# Patient Record
Sex: MALE | Race: WHITE
[De-identification: names, ages, dates, MRNs, and addresses within clinical notes are randomized per-mention and may not be internally consistent; named-entity substitution may affect disease eponyms.]

---

## 2020-10-22 ENCOUNTER — HOSPITAL ENCOUNTER (OUTPATIENT)
Dept: HOSPITAL 41 - JD.SDS | Age: 65
Discharge: HOME | End: 2020-10-22
Attending: SURGERY
Payer: COMMERCIAL

## 2020-10-22 VITALS — SYSTOLIC BLOOD PRESSURE: 127 MMHG | DIASTOLIC BLOOD PRESSURE: 82 MMHG | HEART RATE: 72 BPM

## 2020-10-22 DIAGNOSIS — K64.8: Primary | ICD-10-CM

## 2020-10-22 DIAGNOSIS — D50.9: ICD-10-CM

## 2020-10-22 DIAGNOSIS — Z90.49: ICD-10-CM

## 2020-10-22 DIAGNOSIS — F41.9: ICD-10-CM

## 2020-10-22 DIAGNOSIS — Z01.812: ICD-10-CM

## 2020-10-22 DIAGNOSIS — Z20.828: ICD-10-CM

## 2020-10-22 DIAGNOSIS — K21.9: ICD-10-CM

## 2020-10-22 DIAGNOSIS — E66.9: ICD-10-CM

## 2020-10-22 DIAGNOSIS — Z98.890: ICD-10-CM

## 2020-10-22 DIAGNOSIS — K64.4: ICD-10-CM

## 2020-10-22 DIAGNOSIS — Z79.899: ICD-10-CM

## 2020-10-22 PROCEDURE — 46221 LIGATION OF HEMORRHOID(S): CPT

## 2020-10-22 NOTE — PCM.POSTAN
POST ANESTHESIA ASSESSMENT





- MENTAL STATUS


Mental Status: Other (drowsy )





- VITAL SIGNS


Vital Signs: 


                                Last Vital Signs











Temp  36.6 C   10/22/20 07:10


 


Pulse  59 L  10/22/20 07:10


 


Resp  16   10/22/20 07:10


 


BP  135/75   10/22/20 07:10


 


Pulse Ox  96   10/22/20 07:10














- RESPIRATORY


Respiratory Status: Respiratory Rate WNL, Airway Patent, O2 Saturation Stable, 

Supplemental Oxygen





- CARDIOVASCULAR


CV Status: Pulse Rate WNL, Blood Pressure Stable





- GASTROINTESTINAL


GI Status: No Symptoms





- PAIN


Pain Score: 0





- POST OP HYDRATION


Hydration Status: Adequate & Stable

## 2020-10-22 NOTE — PCM.PREANE
Preanesthetic Assessment





- Anesthesia/Transfusion/Family Hx


Anesthesia History: Prior Anesthesia Without Reaction


Family History of Anesthesia Reaction: No


Transfusion History: No Prior Transfusion(s)





- Review of Systems


General: Other (anemic)


Pulmonary: No Symptoms


Cardiovascular: No Symptoms


Gastrointestinal: Other (GERD, on meds)


Neurological: No Symptoms


Other: Reports: None





- Physical Assessment


NPO Status Date: 10/21/20


NPO Status Time: 21:00


Weight: 96.8 kg


ASA Class: 2


Mental Status: Alert & Oriented x3


Airway Class: Mallampati = 2


Dentition: Reports: Normal Dentition


Thyro-Mental Finger Breadths: 3


Mouth Opening Finger Breadths: 3


ROM/Head Extension: Full


Lungs: Clear to Auscultation, Normal Respiratory Effort


Cardiovascular: Regular Rate, Regular Rhythm





- Allergies


Allergies/Adverse Reactions: 


                                    Allergies











Allergy/AdvReac Type Severity Reaction Status Date / Time


 


No Known Allergies Allergy   Verified 08/24/16 08:23














- Blood


Blood Available: No


Product(s) Available: None





- Anesthesia Plan


Pre-Op Medication Ordered: None





- Acknowledgements


Anesthesia Type Planned: General Anesthesia


Pt an Appropriate Candidate for the Planned Anesthesia: Yes


Alternatives and Risks of Anesthesia Discussed w Pt/Guardian: Yes


Pt/Guardian Understands and Agrees with Anesthesia Plan: Yes





PreAnesthesia Questionnaire


HEENT History: Reports: None, Impaired Vision


Other HEENT History: wears glasses


Cardiovascular History: Reports: None


Respiratory History: Reports: None


Gastrointestinal History: Reports: GERD, Other (See Below)


Other Gastrointestinal History: rectal bleeding


Genitourinary History: Reports: None, Other (See Below)


Other Genitourinary History: erectile dysfunction


Other Musculoskeletal History: cervical hardware


Neurological History: Reports: None


Psychiatric History: Reports: None


Endocrine/Metabolic History: Reports: Obesity/BMI 30+


Hematologic History: Reports: Anemia, Iron Deficiency





- Past Surgical History


GI Surgical History: Reports: Cholecystectomy, Colonoscopy, EGD, Hernia 

Repair/Other





- HOME MEDS


Home Medications: 


                                    Home Meds





Multivitamin [Multivitamins] 1 cap PO DAILY 08/09/16 [History]


Omeprazole 20 mg PO DAILY 08/09/16 [History]


Tadalafil [Cialis] 20 mg PO ASDIRECTED PRN 08/09/16 [History]











- CURRENT (IN HOUSE) MEDS


Current Meds: 





                               Current Medications





Lactated Ringer's (Ringers, Lactated)  1,000 mls @ 125 mls/hr IV ASDIRECTED SANGITA


   Stop: 10/22/20 23:00


Lidocaine/Sodium Bicarbonate (Buffered Lidocaine 1% In Ns 8.4%)  0.25 ml IDERM 

ONETIME PRN


   PRN Reason: Prior to IV Start


   Stop: 10/22/20 18:00


Sodium Chloride (Saline Flush)  10 ml FLUSH ASDIRECTED PRN


   PRN Reason: Keep Vein Open


   Stop: 10/22/20 18:00

## 2020-10-22 NOTE — [id number]
Post Anesthesia Note





- EVALUATION WITHIN 48HRS OF ANESTHETIC


Vital Signs in Normal Range: Yes


Patient Participated in Evaluation: No (talked with nurse)


Respiratory Function Stable: Yes


Airway Patent: Yes


Cardiovascular Function Stable: Yes


Hydration Status Stable: Yes


Pain Control Satisfactory: Yes


Nausea and Vomiting Control Satisfactory: Yes


Mental Status Recovered: Yes


Vital Signs: 


                                Last Vital Signs











Temp  97.5 F   10/22/20 11:00


 


Pulse  72   10/22/20 11:00


 


Resp  16   10/22/20 11:00


 


BP  127/82   10/22/20 11:00


 


Pulse Ox  94 L  10/22/20 11:00

## 2020-10-22 NOTE — PCM.PRNOTE
- Free Text/Narrative


Note: 





Date: 10/22/2020


Operation: hemorrhoidectomy with internal hemorrhoid banding


Surgeon: Anthony Latham MD





Findings: large external hemorrhoidal component. After removal of the left 

column and right posterior column, two areas of prominent internal hemorrhoidal 

tissue were banded. 





Detailed Report:





The patient was taken to the OR and underwent LMA anesthesia. Time out was 

performed. The patient was placed in lithotomy. The perineum was prepped and 

draped in sterile fashion. 40 cc 0.5% marcaine with epinephrine was injected 

circumferentially for a perianal block. First, the left hemorrhoidal column was 

addressed. A clamp was placed at the distal aspect involving the anoderm and the

hemorrhoidal tissue was pulled out through the anal canal after placing the anal

retractor. A Elizabeth clamp was then placed along the base of the hemorrhoidal 

tissue, taking care to avoid the sphincter muscle. Monopolar energy was used to 

divide the tissue deep to the clamp. Near the apex of the hemorrhoidal tissue, a

chromic gut ligature was tied at the base prior to completing transection. The 

specimen was removed, and the tissue was closed with running chromic suture. The

same approach was used for the right posterior column. The anal canal still 

permitted passage of the long finger without resistance or narrowing. Additional

dark red internal hemorrhoidal tissue was noted more superiorly in the lumen; 

the band applicator with suction was used to placed two bands, one on the left 

and one on the right. A roll of kerlix dressed with bacitracin was placed in the

anal canal and mesh underwear placed for dressing. The patient tolerated the 

procedure well.

## 2024-11-05 ENCOUNTER — HOSPITAL ENCOUNTER (OUTPATIENT)
Dept: HOSPITAL 41 - JD.SDS | Age: 69
Discharge: HOME | End: 2024-11-05
Attending: SURGERY
Payer: MEDICARE

## 2024-11-05 VITALS — SYSTOLIC BLOOD PRESSURE: 130 MMHG | DIASTOLIC BLOOD PRESSURE: 77 MMHG | HEART RATE: 58 BPM

## 2024-11-05 DIAGNOSIS — K21.9: ICD-10-CM

## 2024-11-05 DIAGNOSIS — E66.9: ICD-10-CM

## 2024-11-05 DIAGNOSIS — Z79.899: ICD-10-CM

## 2024-11-05 DIAGNOSIS — D17.6: ICD-10-CM

## 2024-11-05 DIAGNOSIS — K40.90: Primary | ICD-10-CM

## 2024-11-05 PROCEDURE — C1781 MESH (IMPLANTABLE): HCPCS

## 2024-11-05 PROCEDURE — 49505 PRP I/HERN INIT REDUC >5 YR: CPT

## 2024-11-05 RX ADMIN — CEFUROXIME ONE MG: 750 INJECTION, POWDER, FOR SOLUTION INTRAMUSCULAR; INTRAVENOUS at 09:15
